# Patient Record
Sex: FEMALE | Race: BLACK OR AFRICAN AMERICAN | NOT HISPANIC OR LATINO | Employment: FULL TIME | URBAN - METROPOLITAN AREA
[De-identification: names, ages, dates, MRNs, and addresses within clinical notes are randomized per-mention and may not be internally consistent; named-entity substitution may affect disease eponyms.]

---

## 2018-03-27 ENCOUNTER — HOSPITAL ENCOUNTER (EMERGENCY)
Facility: OTHER | Age: 23
Discharge: HOME | End: 2018-03-27
Attending: EMERGENCY MEDICINE
Payer: MEDICAID

## 2018-03-27 VITALS
TEMPERATURE: 99 F | BODY MASS INDEX: 24.91 KG/M2 | HEART RATE: 85 BPM | RESPIRATION RATE: 18 BRPM | HEIGHT: 66 IN | DIASTOLIC BLOOD PRESSURE: 67 MMHG | WEIGHT: 155 LBS | SYSTOLIC BLOOD PRESSURE: 107 MMHG | OXYGEN SATURATION: 100 %

## 2018-03-27 DIAGNOSIS — M25.569 KNEE PAIN: ICD-10-CM

## 2018-03-27 DIAGNOSIS — M25.462 KNEE EFFUSION, LEFT: Primary | ICD-10-CM

## 2018-03-27 DIAGNOSIS — R60.0 LEG EDEMA: ICD-10-CM

## 2018-03-27 LAB
B-HCG UR QL: NEGATIVE
CTP QC/QA: YES

## 2018-03-27 PROCEDURE — 81025 URINE PREGNANCY TEST: CPT | Performed by: EMERGENCY MEDICINE

## 2018-03-27 PROCEDURE — 25000003 PHARM REV CODE 250: Performed by: PHYSICIAN ASSISTANT

## 2018-03-27 PROCEDURE — 99284 EMERGENCY DEPT VISIT MOD MDM: CPT

## 2018-03-27 RX ORDER — IBUPROFEN 600 MG/1
600 TABLET ORAL
Status: COMPLETED | OUTPATIENT
Start: 2018-03-27 | End: 2018-03-27

## 2018-03-27 RX ORDER — IBUPROFEN 600 MG/1
600 TABLET ORAL EVERY 6 HOURS PRN
Qty: 20 TABLET | Refills: 0 | Status: SHIPPED | OUTPATIENT
Start: 2018-03-27 | End: 2019-01-29 | Stop reason: SDUPTHER

## 2018-03-27 RX ADMIN — IBUPROFEN 600 MG: 600 TABLET, FILM COATED ORAL at 09:03

## 2018-03-27 NOTE — ED TRIAGE NOTES
"Pt presents to ED w/ c/o left leg pain & foot x 4 day with swelling. Pt describes pain as a throbbing pain "like someone is hitting it" & have difficulty bending left leg. Pt reports pain of an 6/10.  Pt states that she had surgery to her left leg when she was 6 years old. Pt denies recent trauma to left leg. Pt denies hx of DVT and chest pain.   "

## 2018-03-27 NOTE — ED PROVIDER NOTES
Encounter Date: 3/27/2018       History     Chief Complaint   Patient presents with    Leg Pain     pt with 3 days of left upper leg and knee swelling . pt has old injury to leg including pin and rods.     Patient is 22-year-old female who presents with complaints of left knee and leg pain that is been present for days prior to arrival.  She reports waking 4 mornings ago noticing knee pain and swelling.  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  She reports knee has been achy ever since but after going to work all day yesterday at the end of the day she had terrible knee pain and swelling.  She awoke this morning with persistent swelling and pain.  She reports pain is extending down her calf area she reports pain is worse with walking and not improved with rest.  She has not been taking any medications help with her symptoms.  She denies recent travel.  Has no history of DVT PE recent surgeries, hemoptysis chest pain or shortness of breath.  She does not take exogenous estrogen.  She is currently unaccompanied in the ER.          Review of patient's allergies indicates:  No Known Allergies  History reviewed. No pertinent past medical history.  Past Surgical History:   Procedure Laterality Date    LEG SURGERY      Femur fracture with rods placed at that time.Approx. 11 years ago     Family History   Problem Relation Age of Onset    Asthma Mother     Asthma Brother      Social History   Substance Use Topics    Smoking status: Current Every Day Smoker     Packs/day: 0.50     Years: 5.00     Types: Cigarettes    Smokeless tobacco: Not on file    Alcohol use 1.8 oz/week     3 Shots of liquor per week     Review of Systems   Constitutional: Negative for fever.   HENT: Negative for sore throat.    Respiratory: Negative for shortness of breath.    Cardiovascular: Negative for chest pain.   Gastrointestinal: Negative for nausea.   Genitourinary:  Negative for dysuria.   Musculoskeletal: Negative for back pain.        Left leg swelling   Skin: Negative for rash.   Neurological: Negative for weakness.   Hematological: Does not bruise/bleed easily.       Physical Exam     Initial Vitals [03/27/18 0858]   BP Pulse Resp Temp SpO2   112/69 99 18 98.5 °F (36.9 °C) 98 %      MAP       83.33         Physical Exam    Nursing note and vitals reviewed.  Constitutional: She appears well-developed and well-nourished. She is not diaphoretic. No distress.   Patient is 22-year-old female no acute distress but does appear uncomfortable during interview and exam guarding left knee.  Makes good eye contact, speaks in clear full sentences ambulates with antalgic gait.   HENT:   Head: Normocephalic and atraumatic.   Eyes: Conjunctivae and EOM are normal. Pupils are equal, round, and reactive to light. Right eye exhibits no discharge. Left eye exhibits no discharge. No scleral icterus.   Neck: Normal range of motion.   Cardiovascular: Normal rate, regular rhythm, normal heart sounds and intact distal pulses. Exam reveals no gallop and no friction rub.    No murmur heard.  Pulmonary/Chest: Breath sounds normal. No respiratory distress. She has no wheezes. She has no rhonchi. She has no rales.   Abdominal: Soft. Bowel sounds are normal. There is no tenderness. There is no rebound and no guarding.   Musculoskeletal: Normal range of motion. She exhibits no edema or tenderness.   Left knee has medial effusion and joint line tenderness to palpation with no overlying erythema, warmth to touch or skin breakdown.  Patellar crepitus on loading, no anterior posterior drawer sign, pain with varus and valgus stress.  Positive Homans sign.  No palpable cords.  Normal DP and PT pulse.  Bony landmarks otherwise unremarkable with no obvious deformity.    Right lower extremity has normal exam.   Lymphadenopathy:     She has no cervical adenopathy.   Neurological: She is alert and oriented to  person, place, and time. She has normal strength. No cranial nerve deficit or sensory deficit.   Skin: Skin is warm. Capillary refill takes less than 2 seconds. No rash and no abscess noted. No erythema.   Psychiatric: She has a normal mood and affect. Her behavior is normal. Thought content normal.         ED Course   Procedures  Labs Reviewed   POCT URINE PREGNANCY         Imaging Results          X-Ray Knee 3 View Left (Final result)  Result time 03/27/18 10:21:08    Final result by Kevin Marks MD (03/27/18 10:21:08)                 Impression:      No fracture or dislocation. Trace suprapatellar joint effusion is present.      Electronically signed by: Kevin Marks MD  Date:    03/27/2018  Time:    10:21             Narrative:    EXAMINATION:  XR KNEE 3 VIEW LEFT    CLINICAL HISTORY:  Pain in unspecified knee    TECHNIQUE:  AP, lateral, and Merchant views of the left knee were performed.    COMPARISON:  None    FINDINGS:  No fracture or dislocation.  Trace suprapatellar joint effusion is present.                               US Lower Extremity Veins Left (Final result)  Result time 03/27/18 10:17:55    Final result by Kevin Marks MD (03/27/18 10:17:55)                 Impression:      No evidence of deep venous thrombosis in either lower extremity.      Electronically signed by: Kevin Marks MD  Date:    03/27/2018  Time:    10:17             Narrative:    EXAMINATION:  US LOWER EXTREMITY VEINS LEFT    CLINICAL HISTORY:  Localized edema    TECHNIQUE:  Duplex and color flow Doppler evaluation and graded compression of the left lower extremity veins was performed.    COMPARISON:  None    FINDINGS:  Left thigh veins: The common femoral, femoral, popliteal, upper greater saphenous, and deep femoral veins are patent and free of thrombus. The veins are normally compressible and have normal phasic flow and augmentation response.    Left calf veins: The visualized calf veins are patent.    Contralateral CFV: No  evidence of deep venous thrombosis in either lower extremity.    Miscellaneous: None                                     Medical Decision Making:   ED Management:  Urgent evaluation a 22-year-old female who presents with complaints of left knee pain and swelling as well as calf pain concerning for vascular versus osseous abnormality.  She is afebrile, nontoxic appearing, hemodynamically stable.  Physical exam outlined above and reveals effusion to medial aspect the left knee with no obvious deformity.  Normal pulses sensation light touch and strength against resistance to left lower extremity.  No clinical suspicion for septic joint however will obtain venous ultrasound and x-ray for further evaluation.   US reveals no evidence of DVT, XR reveals effusion with out acute osseous process. Pain and effusion could be related to arthritic changes vs ligamentous injury. Will place patient in Ace wrap for compression and stability and will provide crutches for assisted ambulation.  Do not suspect ligamentous instability to the point where complete immobilization is warranted however will check patient on importance of follow-up with orthopedics in the outpatient setting as well as return precautions.  She'll be discharged with prescription for ibuprofen and orthopedic follow-up contact information.  She verbalizes understanding and is amenable to this plan.  Other:   I have discussed this case with another health care provider.       <> Summary of the Discussion: [place]                      Clinical Impression:   The primary encounter diagnosis was Knee effusion, left. Diagnoses of Knee pain and Leg edema were also pertinent to this visit.                           Mackenzie Goldberg PA-C  03/27/18 1129

## 2018-03-27 NOTE — ED NOTES
Patient Identifiers for Yulissa PEREZ checked and correct  LOC: The patient is awake, alert and aware of environment with an appropriate affect, the patient is oriented x 3 and speaking appropriate.  APPEARANCE: Patient resting comfortably and in no acute distress. The patient is clean and well groomed. The patient's clothing is properly fastened.  SKIN: The skin is warm and dry. The patient has normal skin turgor and moist mucus membranes. No rashes or lesions upon observation. Skin Intact , no breakdown noted.  Musculoskeletal : decrease in range of motion to left leg. Move left lower extremity with discomfort . Pt has swelling and tenderness to left leg and foot.  RESPIRATORY: Airway is open and patent, respirations are spontaneous, patient has a normal effort and rate. Breath sounds are clear & equal, bilaterally.  CARDIAC: Patient has a normal rate and rhythm, no peripheral edema noted, capillary refill < 3 seconds.   The patient is awake, alert and cooperative with a calm affect, patient is aware of environment.      Will continue to monitor

## 2019-01-29 ENCOUNTER — HOSPITAL ENCOUNTER (EMERGENCY)
Facility: OTHER | Age: 24
Discharge: HOME | End: 2019-01-29
Attending: EMERGENCY MEDICINE
Payer: MEDICAID

## 2019-01-29 VITALS
DIASTOLIC BLOOD PRESSURE: 77 MMHG | SYSTOLIC BLOOD PRESSURE: 118 MMHG | TEMPERATURE: 98 F | OXYGEN SATURATION: 99 % | RESPIRATION RATE: 18 BRPM | HEART RATE: 73 BPM | BODY MASS INDEX: 24.21 KG/M2 | WEIGHT: 150 LBS

## 2019-01-29 DIAGNOSIS — L02.31 ABSCESS OF GLUTEAL CLEFT: Primary | ICD-10-CM

## 2019-01-29 LAB
B-HCG UR QL: NEGATIVE
CTP QC/QA: YES

## 2019-01-29 PROCEDURE — 25000003 PHARM REV CODE 250: Performed by: PHYSICIAN ASSISTANT

## 2019-01-29 PROCEDURE — 99284 EMERGENCY DEPT VISIT MOD MDM: CPT | Mod: 25

## 2019-01-29 PROCEDURE — 81025 URINE PREGNANCY TEST: CPT | Performed by: EMERGENCY MEDICINE

## 2019-01-29 PROCEDURE — 10060 I&D ABSCESS SIMPLE/SINGLE: CPT

## 2019-01-29 RX ORDER — CEPHALEXIN 500 MG/1
500 CAPSULE ORAL 4 TIMES DAILY
Qty: 20 CAPSULE | Refills: 0 | Status: SHIPPED | OUTPATIENT
Start: 2019-01-29 | End: 2019-02-03

## 2019-01-29 RX ORDER — IBUPROFEN 600 MG/1
600 TABLET ORAL EVERY 6 HOURS PRN
Qty: 15 TABLET | Refills: 0 | Status: SHIPPED | OUTPATIENT
Start: 2019-01-29

## 2019-01-29 RX ORDER — LIDOCAINE HYDROCHLORIDE 10 MG/ML
5 INJECTION INFILTRATION; PERINEURAL
Status: COMPLETED | OUTPATIENT
Start: 2019-01-29 | End: 2019-01-29

## 2019-01-29 RX ORDER — IBUPROFEN 600 MG/1
600 TABLET ORAL
Status: COMPLETED | OUTPATIENT
Start: 2019-01-29 | End: 2019-01-29

## 2019-01-29 RX ORDER — SULFAMETHOXAZOLE AND TRIMETHOPRIM 800; 160 MG/1; MG/1
1 TABLET ORAL 2 TIMES DAILY
Qty: 14 TABLET | Refills: 0 | Status: SHIPPED | OUTPATIENT
Start: 2019-01-29 | End: 2019-02-05

## 2019-01-29 RX ORDER — CEPHALEXIN 500 MG/1
500 CAPSULE ORAL
Status: COMPLETED | OUTPATIENT
Start: 2019-01-29 | End: 2019-01-29

## 2019-01-29 RX ORDER — SULFAMETHOXAZOLE AND TRIMETHOPRIM 800; 160 MG/1; MG/1
1 TABLET ORAL
Status: COMPLETED | OUTPATIENT
Start: 2019-01-29 | End: 2019-01-29

## 2019-01-29 RX ADMIN — IBUPROFEN 600 MG: 600 TABLET ORAL at 03:01

## 2019-01-29 RX ADMIN — SULFAMETHOXAZOLE AND TRIMETHOPRIM 1 TABLET: 800; 160 TABLET ORAL at 03:01

## 2019-01-29 RX ADMIN — LIDOCAINE HYDROCHLORIDE 5 ML: 10 INJECTION, SOLUTION EPIDURAL; INFILTRATION; INTRACAUDAL; PERINEURAL at 04:01

## 2019-01-29 RX ADMIN — CEPHALEXIN 500 MG: 500 CAPSULE ORAL at 03:01

## 2019-01-29 NOTE — ED PROVIDER NOTES
Encounter Date: 1/29/2019       History     Chief Complaint   Patient presents with    Abscess     between butt cheeks x 10 days. Denies drainage.      Patient is a 23-year-old female with no pertinent past medical history who presents to the ED with gluteal pain.  Patient reports pain in her gluteus for the past week.  She states it is painful to sit.  She states the pain has worsened over the past week.  She denies any drainage from this area.  She has never experienced something like this before.  She denies history of abscesses.  She denies fever or other systemic symptoms.  She has not tried any interventions.      The history is provided by the patient.     Review of patient's allergies indicates:  No Known Allergies  History reviewed. No pertinent past medical history.  Past Surgical History:   Procedure Laterality Date    LEG SURGERY      Femur fracture with rods placed at that time.Approx. 11 years ago     Family History   Problem Relation Age of Onset    Asthma Mother     Asthma Brother      Social History     Tobacco Use    Smoking status: Current Every Day Smoker     Packs/day: 0.50     Years: 5.00     Pack years: 2.50     Types: Cigarettes   Substance Use Topics    Alcohol use: Yes     Alcohol/week: 1.8 oz     Types: 3 Shots of liquor per week    Drug use: Yes     Types: Marijuana     Review of Systems   Constitutional: Negative for chills and fever.   HENT: Negative for congestion and sore throat.    Eyes: Negative for pain.   Respiratory: Negative for shortness of breath.    Cardiovascular: Negative for chest pain.   Gastrointestinal: Negative for abdominal pain, diarrhea, nausea and vomiting.   Genitourinary: Negative for dysuria.   Musculoskeletal: Negative for arthralgias.   Skin: Positive for wound (Gluteal).   Neurological: Negative for headaches.       Physical Exam     Initial Vitals [01/29/19 1459]   BP Pulse Resp Temp SpO2   127/75 91 16 98.4 °F (36.9 °C) 100 %      MAP       --          Physical Exam    Constitutional: Vital signs are normal. She is cooperative. No distress.   HENT:   Head: Normocephalic and atraumatic.   Eyes: EOM are normal. Pupils are equal, round, and reactive to light.   Neck: Normal range of motion. Neck supple.   Cardiovascular: Normal rate, regular rhythm and intact distal pulses.   Pulmonary/Chest: Breath sounds normal. She has no wheezes. She has no rhonchi. She has no rales.   Abdominal: Soft. Bowel sounds are normal. There is no tenderness.   Neurological: She is alert and oriented to person, place, and time. GCS eye subscore is 4. GCS verbal subscore is 5. GCS motor subscore is 6.   Skin: Skin is warm and dry.   Right gluteal she has a 3 cm area of redness, induration, and tenderness to palpation. No obvious fluctuance.  Ultrasound reveals possible fluid collection at the gluteal cleft.         ED Course   I & D - Incision and Drainage  Date/Time: 1/29/2019 4:52 PM  Location procedure was performed: [facility] EMERGENCY DEPARTMENT  Performed by: Charly Alicea PA-C  Authorized by: Suad Avery MD   Type: abscess  Body area: anogenital  Location details: gluteal cleft  Anesthesia: local infiltration    Anesthesia:  Local Anesthetic: lidocaine 1% without epinephrine  Anesthetic total: 3 mL  Scalpel size: 11  Incision type: single straight  Complexity: simple  Drainage: pus and  bloody  Drainage amount: scant  Wound treatment: incision,  deloculation and  expression of material  Packing material: none  Patient tolerance: Patient tolerated the procedure well with no immediate complications        Labs Reviewed   POCT URINE PREGNANCY          Imaging Results    None          Medical Decision Making:   Initial Assessment:   Urgent evaluation of a 23 y.o. Female presenting to the emergency department complaining of gluteal pain. Patient is afebrile, nontoxic appearing and hemodynamically stable.  Physical exam consistent with likely gluteal cleft abscess.  Will plan for  I and D.  ED Management:  Patient given analgesics and antibiotics here in the ED.  Abscess was drained as described in procedure note.  Patient was given strict wound care instructions.  She will be sent home with Bactrim and Keflex.  She has no other complaints at this time is stable for discharge.  Other:   I have discussed this case with another health care provider.                      Clinical Impression:     1. Abscess of gluteal cleft                               Charly Alicea PA-C  01/29/19 4933

## 2019-01-29 NOTE — ED TRIAGE NOTES
"Pt states "For past week I have had a sore on my bottom between my butt, it has got worse over the past two days and I can not sit on it." Pt denies any other abnormal symptoms such as cp, sob, n/v, or fever.   "

## 2019-08-25 ENCOUNTER — HOSPITAL ENCOUNTER (EMERGENCY)
Facility: OTHER | Age: 24
Discharge: HOME | End: 2019-08-25
Attending: EMERGENCY MEDICINE

## 2019-08-25 VITALS
HEART RATE: 70 BPM | DIASTOLIC BLOOD PRESSURE: 63 MMHG | RESPIRATION RATE: 18 BRPM | OXYGEN SATURATION: 100 % | SYSTOLIC BLOOD PRESSURE: 118 MMHG | WEIGHT: 140 LBS | TEMPERATURE: 99 F | BODY MASS INDEX: 21.22 KG/M2 | HEIGHT: 68 IN

## 2019-08-25 DIAGNOSIS — J06.9 UPPER RESPIRATORY TRACT INFECTION, UNSPECIFIED TYPE: Primary | ICD-10-CM

## 2019-08-25 DIAGNOSIS — R11.0 NAUSEA: ICD-10-CM

## 2019-08-25 DIAGNOSIS — R19.7 DIARRHEA, UNSPECIFIED TYPE: ICD-10-CM

## 2019-08-25 PROCEDURE — 99284 EMERGENCY DEPT VISIT MOD MDM: CPT

## 2019-08-25 RX ORDER — GUAIFENESIN/DEXTROMETHORPHAN 100-10MG/5
5 SYRUP ORAL 4 TIMES DAILY PRN
Qty: 120 ML | Refills: 0 | COMMUNITY
Start: 2019-08-25 | End: 2019-09-04

## 2019-08-25 RX ORDER — ONDANSETRON 4 MG/1
4 TABLET, ORALLY DISINTEGRATING ORAL EVERY 8 HOURS PRN
Qty: 12 TABLET | Refills: 0 | Status: SHIPPED | OUTPATIENT
Start: 2019-08-25

## 2019-08-25 RX ORDER — FLUTICASONE PROPIONATE 50 MCG
1 SPRAY, SUSPENSION (ML) NASAL 2 TIMES DAILY PRN
Qty: 15 G | Refills: 0 | Status: SHIPPED | OUTPATIENT
Start: 2019-08-25

## 2019-08-26 NOTE — ED PROVIDER NOTES
Encounter Date: 8/25/2019       History     Chief Complaint   Patient presents with    URI     She states that she has been having a runny nose, cough, HA, congestion, and diarrhea for the last 3 days. She states that it's just not going away.      Patient is 24-year-old female no significant past medical history presents with complaints of nasal congestion, cough, headache congestion and diarrhea this been present for 3 days prior to arrival.  She reports that she is also feeling some nausea without vomiting.  She has not been taking any medications to help with her symptoms and admits that she could not go to work today because of her symptoms.  She has no associated fever, chills, chest pain, shortness of breath, dizziness or altered mental status.  She denies abdominal pain. She is currently unaccompanied in the ER.        Review of patient's allergies indicates:  No Known Allergies  No past medical history on file.  Past Surgical History:   Procedure Laterality Date    LEG SURGERY      Femur fracture with rods placed at that time.Approx. 11 years ago     Family History   Problem Relation Age of Onset    Asthma Mother     Asthma Brother      Social History     Tobacco Use    Smoking status: Current Every Day Smoker     Packs/day: 0.50     Years: 5.00     Pack years: 2.50     Types: Cigarettes   Substance Use Topics    Alcohol use: Yes     Alcohol/week: 1.8 oz     Types: 3 Shots of liquor per week    Drug use: Yes     Types: Marijuana     Review of Systems   Constitutional: Negative for fever.   HENT: Positive for congestion. Negative for sore throat.    Respiratory: Positive for cough. Negative for shortness of breath.    Cardiovascular: Negative for chest pain.   Gastrointestinal: Positive for diarrhea. Negative for nausea.   Genitourinary: Negative for dysuria.   Musculoskeletal: Negative for back pain.   Skin: Negative for rash.   Neurological: Positive for headaches. Negative for weakness.    Hematological: Does not bruise/bleed easily.       Physical Exam     Initial Vitals [08/25/19 1820]   BP Pulse Resp Temp SpO2   116/72 92 20 98.3 °F (36.8 °C) 100 %      MAP       --         Physical Exam    Nursing note and vitals reviewed.  Constitutional: She appears well-developed and well-nourished. She is not diaphoretic. No distress.   Healthy-appearing female in no acute distress or apparent pain.  She sounds nasally congested when speaking.  She makes good eye contact, speaks in clear full sentences and ambulates with ease.   HENT:   Head: Normocephalic and atraumatic.   Right Ear: External ear normal.   Left Ear: External ear normal.   Mouth/Throat: Oropharynx is clear and moist. No oropharyngeal exudate.   Posterior oropharynx is erythematous with cobblestoning.  No edema or exudate.  Nasal turbinates are erythematous bilaterally.  TMs are full without erythema, purulence, perforation.  Canals are clear and there is no tragus or mastoid tenderness bilaterally. No anterior cervical lymphadenopathy.   Eyes: Conjunctivae and EOM are normal. Right eye exhibits no discharge. Left eye exhibits no discharge. No scleral icterus.   Neck: Normal range of motion.   No meningismus   Cardiovascular: Normal rate, regular rhythm and normal heart sounds. Exam reveals no gallop and no friction rub.    No murmur heard.  Pulmonary/Chest: Breath sounds normal. She has no wheezes. She has no rhonchi. She has no rales.   Abdominal:   Benign abdomen   Musculoskeletal: Normal range of motion. She exhibits no edema or tenderness.   Lymphadenopathy:     She has no cervical adenopathy.   Neurological: She is alert and oriented to person, place, and time. She has normal strength. No cranial nerve deficit or sensory deficit. GCS score is 15. GCS eye subscore is 4. GCS verbal subscore is 5. GCS motor subscore is 6.   Skin: Skin is warm. Capillary refill takes less than 2 seconds. No rash and no abscess noted. No erythema.    Psychiatric: She has a normal mood and affect. Her behavior is normal. Thought content normal.         ED Course   Procedures  Labs Reviewed - No data to display       Imaging Results    None          Medical Decision Making:   ED Management:  Urgent evaluation a 24-year-old female who presents with complaints most consistent with URI symptoms likely of viral etiology.  She is afebrile, nontoxic appearing, hemodynamically stable.  Physical exam outlined above and reveals HEENT inflammation with no clear evidence of acute bacterial infection.  No diagnostics felt warranted at this time.  Patient also reports a few episodes of diarrhea over the last day or 2.  She reports no associated abdominal pain or cramping and only nausea with no vomiting. The symptoms could also have viral etiology and be part of a viral syndrome.  I have considered but do not suspect any acute intra-abdominal pathology.  She is prescribed symptom management and encouraged to follow up primary care provider in the outpatient setting.  She is educated on ED return precautions verbalized understanding.                      Clinical Impression:       ICD-10-CM ICD-9-CM   1. Upper respiratory tract infection, unspecified type J06.9 465.9   2. Diarrhea, unspecified type R19.7 787.91   3. Nausea R11.0 787.02                                Mackenzie Goldberg PA-C  08/26/19 0131

## 2019-08-26 NOTE — ED NOTES
Neurological: AA&O x 4 with normal strength. There is no deficits.   Head: No trauma/deformities. Face intact with no lacerations or discolorations/deformities. HA and sinus congestion pressure.  Right Ear: External ear normal.   Left Ear: External ear normal.   Eyes: Right eye exhibits no discharge/redness/blurriness. Left eye exhibits no discharge/redness/blurriness.   Neck: Normal range of motion. No nuchal rigidity.   Cardiovascular: Normal rate, regular rhythm, normal heart sounds and intact distal pulses. No gallop/friction rub/murmur heard.  Pulmonary/Chest: Breath sounds normal. No respiratory distress. There is no wheezes/rhonchi/rales. There is no tenderness/deformities. Cough for the three days.  Abdominal: Soft and round. Bowel sounds are normal, with no distension/tenderness/rebound/guarding. Denies N/V. Patient claims diarrhea times 3 days.  Urological: No burning/pain/hematuria.   Musculoskeletal: Normal range of motion with no edema or tenderness/deformities.   Skin: Skin is warm and dry. No rash noted. No erythema.   Psychiatric: Normal mood and affect. Behavior is normal.

## 2021-12-17 ENCOUNTER — HOSPITAL ENCOUNTER (EMERGENCY)
Facility: HOSPITAL | Age: 26
Discharge: HOME | End: 2021-12-17
Attending: EMERGENCY MEDICINE
Payer: MEDICAID

## 2021-12-17 VITALS
TEMPERATURE: 99 F | SYSTOLIC BLOOD PRESSURE: 133 MMHG | BODY MASS INDEX: 23.54 KG/M2 | OXYGEN SATURATION: 100 % | HEART RATE: 80 BPM | WEIGHT: 150 LBS | DIASTOLIC BLOOD PRESSURE: 79 MMHG | RESPIRATION RATE: 18 BRPM | HEIGHT: 67 IN

## 2021-12-17 DIAGNOSIS — R05.9 COUGH: Primary | ICD-10-CM

## 2021-12-17 LAB
CTP QC/QA: YES
CTP QC/QA: YES
GROUP A STREP, MOLECULAR: NEGATIVE
POC MOLECULAR INFLUENZA A AGN: NEGATIVE
POC MOLECULAR INFLUENZA B AGN: NEGATIVE
SARS-COV-2 RDRP RESP QL NAA+PROBE: NEGATIVE

## 2021-12-17 PROCEDURE — 99282 EMERGENCY DEPT VISIT SF MDM: CPT

## 2021-12-17 PROCEDURE — 87651 STREP A DNA AMP PROBE: CPT | Performed by: EMERGENCY MEDICINE

## 2021-12-17 PROCEDURE — 99284 EMERGENCY DEPT VISIT MOD MDM: CPT | Mod: CS,,, | Performed by: EMERGENCY MEDICINE

## 2021-12-17 PROCEDURE — 99284 PR EMERGENCY DEPT VISIT,LEVEL IV: ICD-10-PCS | Mod: CS,,, | Performed by: EMERGENCY MEDICINE

## 2021-12-17 PROCEDURE — U0002 COVID-19 LAB TEST NON-CDC: HCPCS | Performed by: EMERGENCY MEDICINE

## 2021-12-17 PROCEDURE — 87502 INFLUENZA DNA AMP PROBE: CPT

## 2022-10-29 ENCOUNTER — HOSPITAL ENCOUNTER (EMERGENCY)
Facility: OTHER | Age: 27
Discharge: HOME | End: 2022-10-29
Attending: EMERGENCY MEDICINE
Payer: MEDICAID

## 2022-10-29 VITALS
OXYGEN SATURATION: 98 % | DIASTOLIC BLOOD PRESSURE: 80 MMHG | BODY MASS INDEX: 23.54 KG/M2 | TEMPERATURE: 98 F | RESPIRATION RATE: 18 BRPM | HEART RATE: 80 BPM | SYSTOLIC BLOOD PRESSURE: 114 MMHG | WEIGHT: 150 LBS | HEIGHT: 67 IN

## 2022-10-29 DIAGNOSIS — L02.419 CELLULITIS AND ABSCESS OF UPPER EXTREMITY: Primary | ICD-10-CM

## 2022-10-29 DIAGNOSIS — L03.119 CELLULITIS AND ABSCESS OF UPPER EXTREMITY: Primary | ICD-10-CM

## 2022-10-29 LAB
B-HCG UR QL: NEGATIVE
CTP QC/QA: YES
POC MOLECULAR INFLUENZA A AGN: NEGATIVE
POC MOLECULAR INFLUENZA B AGN: NEGATIVE
SARS-COV-2 RDRP RESP QL NAA+PROBE: NEGATIVE

## 2022-10-29 PROCEDURE — 81025 URINE PREGNANCY TEST: CPT | Performed by: EMERGENCY MEDICINE

## 2022-10-29 PROCEDURE — 25000003 PHARM REV CODE 250: Performed by: EMERGENCY MEDICINE

## 2022-10-29 PROCEDURE — 87635 SARS-COV-2 COVID-19 AMP PRB: CPT | Performed by: EMERGENCY MEDICINE

## 2022-10-29 PROCEDURE — 99283 EMERGENCY DEPT VISIT LOW MDM: CPT | Mod: 25

## 2022-10-29 RX ORDER — DOXYCYCLINE HYCLATE 100 MG
100 TABLET ORAL
Status: COMPLETED | OUTPATIENT
Start: 2022-10-29 | End: 2022-10-29

## 2022-10-29 RX ORDER — DOXYCYCLINE 100 MG/1
100 CAPSULE ORAL 2 TIMES DAILY
Qty: 20 CAPSULE | Refills: 0 | Status: SHIPPED | OUTPATIENT
Start: 2022-10-29 | End: 2022-11-08

## 2022-10-29 RX ADMIN — DOXYCYCLINE HYCLATE 100 MG: 100 TABLET, COATED ORAL at 06:10

## 2022-10-29 NOTE — ED TRIAGE NOTES
"Present to the ER with " so really this" pt is pointing to R posterior am and pt states " to check for the flu, just to be on the safe side" c/o " I believe it's a mosquito bite" c/o soreness, itching, raised, red area to  R posterior FA x 2 days, rates pain 0/10, denies fever/chills,   "

## 2022-10-29 NOTE — ED PROVIDER NOTES
Encounter Date: 10/29/2022    SCRIBE #1 NOTE: I, Suadyrn Conklin, am scribing for, and in the presence of,  Luis Fernando Harris MD.     History     Chief Complaint   Patient presents with    Influenza     Pt c.o chills, fever, body aches onset 3 days ago.  Pt states she feels somewhat better.  AAO x 3 nadn skin w.d   ADD:  after triage pt also c/o abscess to right FA. Pt has what appears to be abscess to right FA with redness and swelling noted    abscess     Time seen by provider: 5:34 PM    This is a 27 y.o. female who presents with complaint of an abscess on her right arm since last night. The patient reports that the abscess is itchy and that she has had similar itchy abscesses on her neck in the past. Additionally, the patient states that she is experiencing drowsiness, vomiting, a subjective fever, chills, and a cough. She denies associated muscle aches or dyspnea. Of note, the patient reports that there are individuals in her household who have tested positive for Influenza recently. She denies any allergies to medications.      The history is provided by the patient.   Review of patient's allergies indicates:  No Known Allergies  History reviewed. No pertinent past medical history.  Past Surgical History:   Procedure Laterality Date    LEG SURGERY      Femur fracture with rods placed at that time.Approx. 11 years ago     Family History   Problem Relation Age of Onset    Asthma Mother     Asthma Brother      Social History     Tobacco Use    Smoking status: Every Day     Packs/day: 0.50     Years: 5.00     Pack years: 2.50     Types: Cigarettes    Smokeless tobacco: Never    Tobacco comments:     4-5 cigs per day    Substance Use Topics    Alcohol use: Yes     Alcohol/week: 3.0 standard drinks     Types: 3 Shots of liquor per week    Drug use: Yes     Types: Marijuana     Review of Systems   Constitutional:  Positive for chills and fever.        Drowsiness.   Respiratory:  Positive for cough. Negative for shortness  of breath.    Gastrointestinal:  Positive for nausea and vomiting.   Musculoskeletal:  Negative for myalgias.   Skin:  Negative for rash.   All other systems reviewed and are negative.    Physical Exam     Initial Vitals [10/29/22 1643]   BP Pulse Resp Temp SpO2   110/79 85 18 99.2 °F (37.3 °C) 98 %      MAP       --         Physical Exam    Nursing note and vitals reviewed.  Constitutional: She appears well-developed and well-nourished. She is not diaphoretic. No distress.   HENT:   Head: Normocephalic and atraumatic.   Eyes: Conjunctivae and EOM are normal.   Neck: Neck supple. No JVD present.   Cardiovascular:  Normal rate, regular rhythm and normal heart sounds.           No murmur heard.  Pulmonary/Chest: No respiratory distress. She has no wheezes. She has no rhonchi. She has no rales. She exhibits no tenderness.   Abdominal: Abdomen is soft. She exhibits no distension. There is no abdominal tenderness.   Musculoskeletal:         General: Normal range of motion.      Cervical back: Neck supple.     Neurological: She is alert. She has normal strength.   Skin: Skin is warm and dry. No rash noted.   Erythematous warm tender swelling of right mid ulnar forearm. 6 by 3 cm. No fluctuates or pointing. Too early to drain.    Psychiatric: She has a normal mood and affect.       ED Course   Procedures  Labs Reviewed   SARS-COV-2 RDRP GENE   POCT INFLUENZA A/B MOLECULAR   POCT URINE PREGNANCY          Imaging Results    None          Medications   doxycycline tablet 100 mg (has no administration in time range)     Medical Decision Making:   History:   Old Medical Records: I decided to obtain old medical records.  Initial Assessment:   Patient may have had a viral illness or the symptoms may be from the brewing cellulitis and very early abscess.  Abscess has not coalesced anywhere near enough to do an incision and drainage.  I used a skin marker to outline the cellulitis around it.  Will treat with doxycycline to cover  strep and MRSA.  Patient is not pregnant and does not have the flu or COVID.         Scribe Attestation:   Scribe #1: I performed the above scribed service and the documentation accurately describes the services I performed. I attest to the accuracy of the note.          Physician Attestation for Scribe: I, Darion Harris MD, reviewed documentation as scribed in my presence, which is both accurate and complete.           Clinical Impression:   Final diagnoses:  [L03.119, L02.419] Cellulitis and abscess of upper extremity (Primary)      ED Disposition Condition    Discharge Stable          ED Prescriptions       Medication Sig Dispense Start Date End Date Auth. Provider    doxycycline (VIBRAMYCIN) 100 MG Cap Take 1 capsule (100 mg total) by mouth 2 (two) times daily. for 10 days 20 capsule 10/29/2022 11/8/2022 Luis Fernando Harris MD          Follow-up Information       Follow up With Specialties Details Why Contact Info    [facility] Emergency Dept Emergency Medicine  If symptoms worsen [address] [id number] [phone]        M*Modal Fluency dictation system has been used to dictate either all or a portion of this chart. Despite review of the chart, some dictation errors may still exist due to imperfections in this system.     Luis Fernando Harris MD  10/29/22 6042

## 2022-10-29 NOTE — Clinical Note
"Yulissa Carpenterdickson Archibald was seen and treated in our emergency department on 10/29/2022.  She may return to work on 10/30/2022.       If you have any questions or concerns, please don't hesitate to call.      Kiah Almodovar RN RN    "

## 2023-12-06 ENCOUNTER — HOSPITAL ENCOUNTER (EMERGENCY)
Facility: OTHER | Age: 28
Discharge: HOME | End: 2023-12-06
Attending: EMERGENCY MEDICINE
Payer: COMMERCIAL

## 2023-12-06 VITALS
SYSTOLIC BLOOD PRESSURE: 110 MMHG | RESPIRATION RATE: 17 BRPM | HEART RATE: 72 BPM | BODY MASS INDEX: 27.78 KG/M2 | WEIGHT: 177 LBS | DIASTOLIC BLOOD PRESSURE: 76 MMHG | OXYGEN SATURATION: 100 % | TEMPERATURE: 98 F | HEIGHT: 67 IN

## 2023-12-06 DIAGNOSIS — R07.9 CHEST PAIN: ICD-10-CM

## 2023-12-06 DIAGNOSIS — R10.13 EPIGASTRIC PAIN: Primary | ICD-10-CM

## 2023-12-06 LAB
B-HCG UR QL: NEGATIVE
CTP QC/QA: YES

## 2023-12-06 PROCEDURE — 93010 EKG 12-LEAD: ICD-10-PCS | Mod: ,,, | Performed by: INTERNAL MEDICINE

## 2023-12-06 PROCEDURE — 93010 ELECTROCARDIOGRAM REPORT: CPT | Mod: ,,, | Performed by: INTERNAL MEDICINE

## 2023-12-06 PROCEDURE — 25000003 PHARM REV CODE 250: Performed by: NURSE PRACTITIONER

## 2023-12-06 PROCEDURE — 99283 EMERGENCY DEPT VISIT LOW MDM: CPT

## 2023-12-06 PROCEDURE — 93005 ELECTROCARDIOGRAM TRACING: CPT

## 2023-12-06 PROCEDURE — 81025 URINE PREGNANCY TEST: CPT | Performed by: EMERGENCY MEDICINE

## 2023-12-06 RX ORDER — MAG HYDROX/ALUMINUM HYD/SIMETH 200-200-20
30 SUSPENSION, ORAL (FINAL DOSE FORM) ORAL ONCE
Status: COMPLETED | OUTPATIENT
Start: 2023-12-06 | End: 2023-12-06

## 2023-12-06 RX ORDER — SUCRALFATE 1 G/10ML
1 SUSPENSION ORAL 4 TIMES DAILY
Qty: 414 ML | Refills: 0 | Status: SHIPPED | OUTPATIENT
Start: 2023-12-06

## 2023-12-06 RX ORDER — LIDOCAINE HYDROCHLORIDE 20 MG/ML
15 SOLUTION OROPHARYNGEAL ONCE
Status: COMPLETED | OUTPATIENT
Start: 2023-12-06 | End: 2023-12-06

## 2023-12-06 RX ORDER — FAMOTIDINE 20 MG/1
40 TABLET, FILM COATED ORAL
Status: COMPLETED | OUTPATIENT
Start: 2023-12-06 | End: 2023-12-06

## 2023-12-06 RX ORDER — FAMOTIDINE 20 MG/1
20 TABLET, FILM COATED ORAL 2 TIMES DAILY PRN
Qty: 40 TABLET | Refills: 0 | Status: SHIPPED | OUTPATIENT
Start: 2023-12-06 | End: 2024-12-05

## 2023-12-06 RX ORDER — ACETAMINOPHEN 500 MG
1000 TABLET ORAL
Status: COMPLETED | OUTPATIENT
Start: 2023-12-06 | End: 2023-12-06

## 2023-12-06 RX ADMIN — LIDOCAINE HYDROCHLORIDE 15 ML: 20 SOLUTION ORAL at 11:12

## 2023-12-06 RX ADMIN — ACETAMINOPHEN 1000 MG: 500 TABLET ORAL at 11:12

## 2023-12-06 RX ADMIN — FAMOTIDINE 40 MG: 20 TABLET ORAL at 11:12

## 2023-12-06 RX ADMIN — ALUMINUM HYDROXIDE, MAGNESIUM HYDROXIDE, AND SIMETHICONE 30 ML: 200; 200; 20 SUSPENSION ORAL at 11:12

## 2023-12-06 NOTE — Clinical Note
"Yulissa Carpenterdickson Archibald was seen and treated in our emergency department on 12/6/2023.  She may return to work on 12/08/2023.       If you have any questions or concerns, please don't hesitate to call.      Berry Wells NP"

## 2023-12-06 NOTE — ED PROVIDER NOTES
"SCRIBE #1 NOTE: I, Terrell James, am scribing for, and in the presence of,  Berry Wells NP. I have scribed the following portions of the note - Other sections scribed: HPI, ROS, PE.         Source of History:  Patient    Chief complaint:  Chest Pain (Reports intermittent, mid sternal chest pain since yesterday. States "feels like something is just sitting there", pain is worse after eating. Denies SOB, cough. Denies medical hx)      HPI:  Yulissa Archibald is a 28 y.o. female presenting with burning central chest pain starting yesterday. She took Rolaids yesterday for this without relief. Her pain was exacerbated by drinking Gatorade this morning. While she denies any prior instances of reflux, she does note eating fast sometimes. She denies any difficulty swallowing, sore throat, congestion, cough, shortness of breath, nausea or vomiting. SHx includes daily tobacco smoking. Patient has been able to keep down fluids and solids this am.    This is the extent to the patients complaints today here in the emergency department.    ROS:   See HPI    Review of patient's allergies indicates:  No Known Allergies    PMH:  As per HPI and below:  No past medical history on file.  Past Surgical History:   Procedure Laterality Date    LEG SURGERY      Femur fracture with rods placed at that time.Approx. 11 years ago       Social History     Tobacco Use    Smoking status: Every Day     Current packs/day: 0.50     Average packs/day: 0.5 packs/day for 5.0 years (2.5 ttl pk-yrs)     Types: Cigarettes    Smokeless tobacco: Never    Tobacco comments:     4-5 cigs per day    Substance Use Topics    Alcohol use: Yes     Alcohol/week: 3.0 standard drinks of alcohol     Types: 3 Shots of liquor per week    Drug use: Yes     Types: Marijuana       Physical Exam:    /76   Pulse 72   Temp 97.9 °F (36.6 °C) (Oral)   Resp 17   Ht 5' 7" (1.702 m)   Wt 80.3 kg (177 lb)   SpO2 100%   BMI 27.72 kg/m²   Nursing note and vital " signs reviewed.  Physical Exam  Constitutional:       General: She is not in acute distress.     Appearance: She is not ill-appearing or toxic-appearing.   HENT:      Head: Normocephalic and atraumatic.   Eyes:      General: No scleral icterus.     Conjunctiva/sclera: Conjunctivae normal.   Cardiovascular:      Rate and Rhythm: Normal rate and regular rhythm.      Pulses: Normal pulses.   Pulmonary:      Effort: Pulmonary effort is normal. No respiratory distress.      Breath sounds: No wheezing or rales.   Chest:      Chest wall: Tenderness present.      Comments: Reproducible sternal tenderness  Abdominal:      General: Abdomen is flat. There is no distension.      Palpations: Abdomen is soft.      Tenderness: There is abdominal tenderness in the epigastric area.   Musculoskeletal:         General: No swelling or tenderness. Normal range of motion.      Cervical back: Normal range of motion. No tenderness.   Skin:     General: Skin is warm and dry.      Capillary Refill: Capillary refill takes less than 2 seconds.      Findings: No erythema.   Neurological:      General: No focal deficit present.      Mental Status: She is alert and oriented to person, place, and time.      Gait: Gait normal.   Psychiatric:         Behavior: Behavior normal.              MDM/ Differential Dx:   Urgent evaluation of 28-year-old female presenting with the sensation of something feeling stuck in her chest as well as chest pain.  Patient afebrile, not toxic appearing hemodynamically stable.  On exam patient has mild epigastric and reproducible sternal tenderness.  Based off of history and exam, I suspect patient may have had esophageal irritation/scratch from eating, or likely GERD or gastritis.  Considered but do not suspect ACS.  Will give GI cocktail and Pepcid.  If symptoms do not improve, will consider chest pain workup, however at this point given no risk factors, do not feel like cardiac labs CXR indicated this time.  EKG  ordered from tele triage    Differential Diagnosis includes, but is not limited to:  GERD, gastritis, globus sensation, esophagitis, ACS    ED Course as of 12/06/23 1811   Wed Dec 06, 2023   1128 EKG Interpretation: I independently reviewed and interpreted EKG which shows shows normal sinus rhythm, rate of 90 beats per minute, normal intervals, no STEMI. [GS]   1211 At bedside to reassess patient.  She is complete resolution of her symptoms suggesting gastritis versus GERD as source of her symptoms.  Will discharge patient home with Pepcid to take p.r.n. as well as Carafate as needed.  Will have patient follow-up with GI, referral placed.  Patient is amenable to this plan and discharged home in good condition. Patient educated on signs and symptoms to monitor for and when to return to ED. Patient verbalized understanding agrees with treatment plan. All questions and concerns addressed.      [CU]      ED Course User Index  [CU] Berry Wells, SALLY  [GS] Terrell James              Scribe Attestation:   Scribe #1: I performed the above scribed service and the documentation accurately describes the services I performed. I attest to the accuracy of the note.    Provider Attestation for Scribe: I, Berry Wells, reviewed documentation as scribed in my presence, which is both accurate and complete.    Diagnostic Impression:    1. Epigastric pain    2. Chest pain         ED Disposition Condition    Discharge Good            ED Prescriptions       Medication Sig Dispense Start Date End Date Auth. Provider    famotidine (PEPCID) 20 MG tablet Take 1 tablet (20 mg total) by mouth 2 (two) times daily as needed for Heartburn. 40 tablet 12/6/2023 12/5/2024 Berry Wells NP    sucralfate (CARAFATE) 100 mg/mL suspension Take 10 mLs (1 g total) by mouth 4 (four) times daily. 414 mL 12/6/2023 -- Berry Wells NP          Follow-up Information       Follow up With Specialties Details Why Contact Info    Locations,  [facility] Schedule an appointment as soon as possible for a visit   [address]  [address]  [facility] [address]  [phone]      Gastroenterology, [facility] - Gastroenter[facility]   [address] [facility] [address]  [phone]                 Berry Wells, SALLY  12/06/23 0539

## 2023-12-06 NOTE — FIRST PROVIDER EVALUATION
" Emergency Department TeleTriage Encounter Note      CHIEF COMPLAINT    Chief Complaint   Patient presents with    Chest Pain     Reports intermittent, mid sternal chest pain since yesterday. States "feels like something is just sitting there", pain is worse after eating. Denies SOB, cough. Denies medical hx       VITAL SIGNS   Initial Vitals [12/06/23 0936]   BP Pulse Resp Temp SpO2   113/78 77 18 -- 100 %      MAP       --            ALLERGIES    Review of patient's allergies indicates:  No Known Allergies    PROVIDER TRIAGE NOTE  This is a teletriage evaluation of a 28 y.o. female presenting to the ED complaining of chest pain. Patient reports intermittent midsternal chest pain since yesterday. Pain worst after eating. She denies nausea or vomiting. Pain does not radiate. She took rolaids without relief.    Patient is alert and oriented. She speaks in complete sentences. She is sitting upright in the chair in no distress.     Initial orders will be placed and care will be transferred to an alternate provider when patient is roomed for a full evaluation. Any additional orders and the final disposition will be determined by that provider.         ORDERS  Labs Reviewed   POCT URINE PREGNANCY       ED Orders (720h ago, onward)      Start Ordered     Status Ordering Provider    12/06/23 0938 12/06/23 0938  EKG 12-lead  Once         Completed by KARINA MOORE on 12/6/2023 at  9:52 AM MALACHI LARKIN    12/06/23 0938 12/06/23 0938  POCT urine pregnancy  Once        Comments: For women of childbearing age w/o hysterectomy or known pregnancy.    Ordered MALACHI LARKIN              Virtual Visit Note: The provider triage portion of this emergency department evaluation and documentation was performed via [email or web address], a HIPAA-compliant telemedicine application, in concert with a tele-presenter in the room. A face to face patient evaluation with one of my colleagues will occur once the patient is placed in an emergency " department room.      DISCLAIMER: This note was prepared with [employer] voice recognition transcription software. Garbled syntax, mangled pronouns, and other bizarre constructions may be attributed to that software system.